# Patient Record
Sex: MALE
[De-identification: names, ages, dates, MRNs, and addresses within clinical notes are randomized per-mention and may not be internally consistent; named-entity substitution may affect disease eponyms.]

---

## 2023-06-23 PROBLEM — Z00.00 ENCOUNTER FOR PREVENTIVE HEALTH EXAMINATION: Status: ACTIVE | Noted: 2023-06-23

## 2023-06-26 ENCOUNTER — APPOINTMENT (OUTPATIENT)
Dept: PEDIATRIC CARDIOLOGY | Facility: CLINIC | Age: 28
End: 2023-06-26
Payer: MEDICAID

## 2023-06-26 VITALS
WEIGHT: 135.58 LBS | DIASTOLIC BLOOD PRESSURE: 63 MMHG | SYSTOLIC BLOOD PRESSURE: 108 MMHG | HEIGHT: 69.88 IN | BODY MASS INDEX: 19.63 KG/M2 | HEART RATE: 63 BPM

## 2023-06-26 DIAGNOSIS — Q21.0 VENTRICULAR SEPTAL DEFECT: ICD-10-CM

## 2023-06-26 DIAGNOSIS — Z87.74 PERSONAL HISTORY OF (CORRECTED) CONGENITAL MALFORMATIONS OF HEART AND CIRCULATORY SYSTEM: ICD-10-CM

## 2023-06-26 PROCEDURE — 93303 ECHO TRANSTHORACIC: CPT

## 2023-06-26 PROCEDURE — 93325 DOPPLER ECHO COLOR FLOW MAPG: CPT

## 2023-06-26 PROCEDURE — 93000 ELECTROCARDIOGRAM COMPLETE: CPT

## 2023-06-26 PROCEDURE — 93320 DOPPLER ECHO COMPLETE: CPT

## 2023-06-26 PROCEDURE — 99204 OFFICE O/P NEW MOD 45 MIN: CPT | Mod: 25

## 2023-06-26 RX ORDER — AMOXICILLIN 500 MG/1
500 CAPSULE ORAL
Qty: 4 | Refills: 4 | Status: ACTIVE | COMMUNITY
Start: 2023-06-26 | End: 1900-01-01

## 2023-06-26 NOTE — CARDIOLOGY SUMMARY
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 The posterior left coronary cusp is also distorted and appears to prolapse slightly beyond the plane of the aortic valve leaflefts with an elongated and somewhat thickened appearance (image 63).\par \par There is a slight distortion of the posterior aortic sinus near the mitral - aortic intervalvular fibrosa with an outpouching  suggestive of psuedoaneurysm formation, measuring ~ 1.26 x 1 cm (image 65, frames 29-49).S/p patch closure ventricular septal defect surgery.\par \par Tiny residual ventricular septal defect.\par \par Normal left ventricular systolic function.\par \par Mild aortic valve regurgitation.\par \par No pericardial effusion.\par \par Patent foramen ovale with left to right shunt, normal variant.\par \par Qualitatively normal right ventricular systolic function.

## 2023-06-26 NOTE — CONSULT LETTER
[Today's Date] : [unfilled] [Name] : Name: [unfilled] [] : : ~~ [Today's Date:] : [unfilled] [Dear  ___:] : Dear Dr. [unfilled]: [Consult - Multiple Provider] : Thank you very much for allowing us to participate in the care of this patient. If you have any questions, please do not hesitate to contact us. [Sincerely,] : Sincerely, [FreeTextEntry4] : Dr. Rasta Lawson [de-identified] : Anabella Higuera, MSN, CPNP-AC, PC\par Pediatric Cardiology, Adult Congenital Cardiology\par [facility]\par [facility]\par \par [facility] MD, LUCR[facility]\par Director, Adult Congenital Heart Program\par Director, High Risk Cardiovascular Obstetrics\par [facility]\par \par [address] [place], [place] [address]us: [phone]\par [facility] Office: [phone]\par [facility] Office: [phone]\par

## 2023-06-26 NOTE — HISTORY OF PRESENT ILLNESS
[FreeTextEntry1] : We had the pleasure of seeing SREEKANTH RUIZ for evaluation today in the Adult Congenital Heart Program at [facility] at the request of Dr. Lawson.\par \par Sreekanth is a 27 year old male s/p VSD repair 2012 in [facility]. According to him there is a residual VSD still present. The is no significant past medical history, no history of any febrile illnesses as a child. His exercise capacity is excellent. He is on no medications.\par \par From a cardiovascular perspective,  he has no complaints of chest pain, palpitations, shortness of breath, peripheral edema, dizziness or syncope.\par \par

## 2023-06-26 NOTE — DISCUSSION/SUMMARY
[Needs SBE Prophylaxis] : [unfilled]  needs bacterial endocarditis prophylaxis. SBE prophylaxis is indicated for dental and invasive ENT procedures. (Circulation. 2007; 116: 3028-0674) [FreeTextEntry1] : In summary, Sreekanth is a 27 year old male s/p VSD repair 2012 in [facility].  Currently he is without complaints.  His echo demonstrates a tiny residual VSD, distortion of the aortic valve with mild AI and a outpouching of the aorta suggestive of a pseudoaneurysm.  \par \par To further define the outpouching we will perform at CT scan.  \par \par In light of his residual VSD we would recommend SBE prophylaxis with all dental work.\par \par We discussed the importance of limiting alcohol and encourage his continued active lifestyle.

## 2023-06-26 NOTE — PHYSICAL EXAM
[General Appearance - Alert] : alert [General Appearance - Well Nourished] : well nourished [General Appearance - Well-Appearing] : well appearing [Respiration, Rhythm And Depth] : normal respiratory rhythm and effort [Auscultation Breath Sounds / Voice Sounds] : breath sounds clear to auscultation bilaterally [Chest Surgical / Traumatic Scar] : chest incision well healed [Heart Rate And Rhythm] : normal heart rate and rhythm [Heart Sounds] : normal S1 and S2 [II] : a grade 2/4  [LUSB] : LUSB [Abdomen Soft] : soft [Nail Clubbing] : no clubbing  or cyanosis of the fingernails [Musculoskeletal Exam: Normal Movement Of All Extremities] : normal movements of all extremities

## 2023-07-12 ENCOUNTER — APPOINTMENT (OUTPATIENT)
Dept: CT IMAGING | Facility: HOSPITAL | Age: 28
End: 2023-07-12

## 2023-07-19 DIAGNOSIS — I35.1 NONRHEUMATIC AORTIC (VALVE) INSUFFICIENCY: ICD-10-CM

## 2023-08-24 ENCOUNTER — APPOINTMENT (OUTPATIENT)
Dept: MRI IMAGING | Facility: HOSPITAL | Age: 28
End: 2023-08-24

## 2023-09-21 ENCOUNTER — APPOINTMENT (OUTPATIENT)
Dept: MRI IMAGING | Facility: HOSPITAL | Age: 28
End: 2023-09-21